# Patient Record
Sex: FEMALE | Race: WHITE | NOT HISPANIC OR LATINO | Employment: FULL TIME
[De-identification: names, ages, dates, MRNs, and addresses within clinical notes are randomized per-mention and may not be internally consistent; named-entity substitution may affect disease eponyms.]

---

## 2017-06-03 ENCOUNTER — HEALTH MAINTENANCE LETTER (OUTPATIENT)
Age: 32
End: 2017-06-03

## 2019-09-29 ENCOUNTER — HEALTH MAINTENANCE LETTER (OUTPATIENT)
Age: 34
End: 2019-09-29

## 2021-01-14 ENCOUNTER — HEALTH MAINTENANCE LETTER (OUTPATIENT)
Age: 36
End: 2021-01-14

## 2021-10-23 ENCOUNTER — HEALTH MAINTENANCE LETTER (OUTPATIENT)
Age: 36
End: 2021-10-23

## 2022-02-12 ENCOUNTER — HEALTH MAINTENANCE LETTER (OUTPATIENT)
Age: 37
End: 2022-02-12

## 2022-10-10 ENCOUNTER — HEALTH MAINTENANCE LETTER (OUTPATIENT)
Age: 37
End: 2022-10-10

## 2023-03-25 ENCOUNTER — HEALTH MAINTENANCE LETTER (OUTPATIENT)
Age: 38
End: 2023-03-25

## 2024-09-05 ENCOUNTER — VIRTUAL VISIT (OUTPATIENT)
Dept: CONSULT | Facility: CLINIC | Age: 39
End: 2024-09-05
Attending: GENETIC COUNSELOR, MS
Payer: COMMERCIAL

## 2024-09-05 DIAGNOSIS — Z84.89 FAMILY HISTORY OF GENETIC DISEASE: Primary | ICD-10-CM

## 2024-09-05 DIAGNOSIS — Z82.0 FAMILY HX OF ALS (AMYOTROPHIC LATERAL SCLEROSIS): ICD-10-CM

## 2024-09-05 PROCEDURE — 96040 HC GENETIC COUNSELING, EACH 30 MINUTES: CPT | Mod: GT,95 | Performed by: GENETIC COUNSELOR, MS

## 2024-09-05 NOTE — Clinical Note
2024      RE: Micaela Martinez  [address] [place] [id number]     Dear Colleague,    Thank you for the opportunity to participate in the care of your patient, Micaela Martinez, at the [facility] at [facility]. Please see a copy of my visit note below.    Micaela Martinez was seen for a genetic counseling appointment given her family history of TARDBP-related ALS.     Pertinent Medical History: Micaela is a 38 year old female with a family history of TARDBP-related ALS. Her mother was found to have a pathogenic TARDBP variant c. 1144G>A. Micaela denies symptoms of ALS or dementia.     Family History: A three generation pedigree was obtained at Micaela's mother's previous appointment and scanned into the EMR. This family history is by patient report only and has not been verified with medical records except where noted. The following information is significant:   Micaela's mother Iris has a pathogenic TARDBP variant c.1144G>A.   Micaela has five maternal aunts and three maternal uncles. Her uncle, Jovanny, (age 62) is alive and well and has one healthy daughter and one healthy son. Her aunt Marquise  at age 64 due to glioblastoma. She had three healthy sons and many heathy grandchildren. Her aunt Sophie  young due to an explosion that also killed her 18 month old daughter. Sophie has one other daughter (age 43) who is alive and well and has a son and daughter of her own who are alive and well. Micaela's aunt Danette  at age 75 due to Alzheimer's disease. She had on son and three daughters who are alive and well and one daughter who had breast cancer that was treated due to a double mastectomy. Micaela's aunt Татьяна (age 61) has a diagnosis of Schizophrenia. She has one daughter and two sons who are alive and well. Micaela's aunt Ingrid (age 74) is alive and well and tested negative for the familial TARDBP genetic variant. She has two  sons and one daughter who are alive and well and many grandchildren who are alive and well. Micaela's uncle Michael (age 77) has episodic fainting or blacking out due to unknown causes. He has one healthy son and one healthy daughter. Micaela's uncle Yoel (age 81) is in assisted living for age related reasons and spinal problems. He has one healthy son and one healthy daughter.  Micaela's maternal grandfather  at age 69 due to bulbar onset ALS that was diagnosed at age 67. He had bulbar symptoms and weak neck muscles at the time of his death. His sister  at age 92 due to limb onset ALS, his brother is  due to non ALS related causes, and his two other brothers  due to bulbar onset ALS. Micaela's maternal cousin once removed, Patt,  due to ALS and was found to have a TARDBP pathogenic variant. Paternal ancestry is [place].   Micaela's maternal grandmother  due to dementia. She had a history of non hodgkin's lymphoma. Micaela's maternal great grandmother is  and had cognitive concerns and brain trauma due to a motor vehicle accident during her lifetime. Micaela's  maternal great grandmother's sister is  and had Alzheimer's disease. Maternal ancestry is [place].   Family history is otherwise negative for ALS, dementia and genetic testing.     Discussion: Micaela's mother Denae Martinez was found to have a pathogenic variant in the TARDBP gene. This variant is technically called c. 1144G>A. Disease causing variants in the TARDBP gene are associated with an increased risk for ALS and frontotemporal dementia (FTD).      Clinical Presentation of ALS and FTD  Amyotrophic lateral sclerosis (ALS) is a condition that affects the motor neurons.  Motor neurons are responsible for sending the necessary signals to the muscles, to allow for movement and speech. In ALS, these motor neurons break down and eventually lead to loss of speech and muscle control. This is a progressive disorder,  meaning that the symptoms of this condition worsen over time. However, the rate of progression and muscles groups that are impacted the most is different for different individuals. For example, the first symptom that some people may experience is weakness in their throat muscles while others may experience weakness in an arm or a leg at the onset of symptoms.     Frontotemporal dementia (FTD) is a condition that affects the frontal and temporal lobes of the brain.These areas of the brain are located on the front and sides of your brain and are generally associated with personality, behavior and language. FTD causes these portions of the brain to shrink which can lead to a wide variety of symptoms including changes in personality, behavior, judgement, empathy, ability to understand the meaning of words, ability to give a name to people or objects and ability to pronounce certain words. People with FTD may also have slow or slurred speech and have difficulties putting sentences together.     A variant in the TARDBP gene presents differently in different people. Individuals with a variant in this gene may develop ALS, FTD, both of these conditions or none of these conditions. However, if an individual becomes affected, they most often develop ALS without signs or symptoms of FTD. Additionally, these conditions have a highly variable age of onset. For those that develop ALS, the age of symptom onset is most often between 40 and 65 but they can occur at any point in a person's life.  If Micaela is positive for this genetic change, we are unable to predict which features of this condition she would have in the future, the age of onset for these conditions or the speed of progression based on this genetic testing.     Inheritance of TARDBP  Disease causing changes in TARDBP are  typically inherited in an autosomal dominant pattern, meaning that a person only needs one nonworking gene copy to show signs or symptoms of a  condition and that both males and females can have the condition. If an individual has a disease causing change in their TARDBP gene, there is a 50% chance with each pregnancy that they will have a child who also has this genetic change and a 50% chance that they will have a child who does not have this genetic change.      Family Planning  Family planning options including PGT, prenatal testing, adoption and egg donation were briefly discussed. I explained that prenatal testing is only recommended if Micaela plans to terminate a pregnancy based on these results. I also explained that family planning options for the familial TARDBP gene variant are only available if Micaela tests positive for this variant.    Genetic Testing  We discussed the availability of genetic testing for ALS. There are several genes that have been found to be associated with ALS and/or FTD. This testing would only look for the specific change in the TARDBP gene that was identified in Micaela's family member to determine if she also has this change. In particular, we discussed that there are two possible results:   Negative: meaning normal or the familial mutation was not identified in the TARDBP gene  Positive: meaning a mutation that is disease causing was found in the TARDBP gene     Risks benefits and limitations for testing in asymptomatic individuals were reviewed. In particular, we discussed the importance of having a good support system in place during and after this testing process. Pre-symptomatic testing is an individual choice. Some people prefer to know so they can be aware of any symptoms and seek appropriate medical care and for family planning. Other people find it difficult to live in anticipation of the condition and prefer not to know. Future insurability is often a question for individuals undergoing pre-symptomatic testing. While there are federal and state laws that protect people from health insurance discrimination, there  is not as much regulation on life and disability insurance. There are many things to consider when contemplating pre-symptomatic testing genetic counseling and talking to a therapist or other support person can help in the decision making process. As treatments for ALS are developed pre-symptomatic testing may be an avenue to detect those individuals at risk to develop ALS and provide preventative treatment. However, at the time of this appointment, there is no cure for ALS or FTD.      Micaela expressed an excellent understanding of this information and stated that she would like to proceed with TARDBP genetic testing so that she can be well informed and better understand the risks to her future children. Micaela has a long standing history with a therapist that she trusts. She is not actively meeting with this therapist but can reestablish care should she feel it would be helpful. Micaela has told her parents, friends and fiance that she is undergoing this testing. She states that all of these people are an excellent support system. Micaela requested a neurology appointment for baseline evaluation if her genetic test results are positive. If her results are positive, this appointment will be scheduled        Plan:  1. TARDBP familial variant testing at the [facility] molecular diagnostics lab. Micaela will ask her mother Iris to submit a control sample for this testing.  2.Micaela requested an appointment with our neurologists specializing in ALS (Dr Gonsalves or Dr David) for a baseline neurologic evaluation if her results are positive. This appointment will be scheduled if her results are positive.  3. An in person appointment is scheduled in 6 weeks for in person results return. Micaela consented to waiting to receive her results until this appointment. Micaela will not be available to get these results between October 18th and 27 as she is getting  and going on her honeymoon.   4. Contact information was provided  should any questions arise in the future.       Shantel Lim [facility]  Genetic Counselor  Division of Genetics and Metabolism  (p) [phone]  (f) [phone]      Total time spent in consultation with the family was approximately 42 minutes     Cc: No Letter     Addendum: Micaela contacted me on 9/6/24 at 1pm and explained that, after thinking about it and discussing with her fiance, she does not want to proceed with familial TARDBP genetic testing. She stated that she was more anxious about the testing than she originally though she'd be and that the family planning options we discussed were not right for her. Micaela was encouraged to contact me should she have questions or would like to get tested in the future.      Please do not hesitate to contact me if you have any questions/concerns.     Sincerely,       Shantel Lim, GC

## 2024-09-05 NOTE — NURSING NOTE
How would you like to obtain your AVS? [employer]  If the video visit is dropped, the invitation should be resent by: Text to cell phone: [phone]  Will anyone else be joining your video visit? Floridalma Moya, EMT

## 2024-09-06 NOTE — PROGRESS NOTES
Micaela Martinez was seen for a genetic counseling appointment given her family history of TARDBP-related ALS.     Pertinent Medical History: Micaela is a 38 year old female with a family history of TARDBP-related ALS. Her mother was found to have a pathogenic TARDBP variant c. 1144G>A. Micaela denies symptoms of ALS or dementia.     Family History: A three generation pedigree was obtained at Micaela's mother's previous appointment and scanned into the EMR. This family history is by patient report only and has not been verified with medical records except where noted. The following information is significant:   Micaela's mother Iris has a pathogenic TARDBP variant c.1144G>A.   Micaela has five maternal aunts and three maternal uncles. Her uncle, Jovanny, (age 62) is alive and well and has one healthy daughter and one healthy son. Her aunt Marquise  at age 64 due to glioblastoma. She had three healthy sons and many heathy grandchildren. Her aunt Sophie  young due to an explosion that also killed her 18 month old daughter. Sophie has one other daughter (age 43) who is alive and well and has a son and daughter of her own who are alive and well. Micaela's aunt Danette  at age 75 due to Alzheimer's disease. She had on son and three daughters who are alive and well and one daughter who had breast cancer that was treated due to a double mastectomy. Micaela's aunt Татьяна (age 61) has a diagnosis of Schizophrenia. She has one daughter and two sons who are alive and well. Micaela's aunt Ingrid (age 74) is alive and well and tested negative for the familial TARDBP genetic variant. She has two sons and one daughter who are alive and well and many grandchildren who are alive and well. Micaela's uncle Michael (age 77) has episodic fainting or blacking out due to unknown causes. He has one healthy son and one healthy daughter. Micaela's uncle Yoel (age 81) is in assisted living for age related reasons and spinal problems. He has one healthy son  and one healthy daughter.  Micaela's maternal grandfather  at age 69 due to bulbar onset ALS that was diagnosed at age 67. He had bulbar symptoms and weak neck muscles at the time of his death. His sister  at age 92 due to limb onset ALS, his brother is  due to non ALS related causes, and his two other brothers  due to bulbar onset ALS. Micaela's maternal cousin once removed, Patt,  due to ALS and was found to have a TARDBP pathogenic variant. Paternal ancestry is [place].   Micaela's maternal grandmother  due to dementia. She had a history of non hodgkin's lymphoma. Micaela's maternal great grandmother is  and had cognitive concerns and brain trauma due to a motor vehicle accident during her lifetime. Micaela's  maternal great grandmother's sister is  and had Alzheimer's disease. Maternal ancestry is [place].   Family history is otherwise negative for ALS, dementia and genetic testing.     Discussion: Micaela's mother Deane Martinez was found to have a pathogenic variant in the TARDBP gene. This variant is technically called c. 1144G>A. Disease causing variants in the TARDBP gene are associated with an increased risk for ALS and frontotemporal dementia (FTD).      Clinical Presentation of ALS and FTD  Amyotrophic lateral sclerosis (ALS) is a condition that affects the motor neurons.  Motor neurons are responsible for sending the necessary signals to the muscles, to allow for movement and speech. In ALS, these motor neurons break down and eventually lead to loss of speech and muscle control. This is a progressive disorder, meaning that the symptoms of this condition worsen over time. However, the rate of progression and muscles groups that are impacted the most is different for different individuals. For example, the first symptom that some people may experience is weakness in their throat muscles while others may experience weakness in an arm or a leg at the onset of  symptoms.     Frontotemporal dementia (FTD) is a condition that affects the frontal and temporal lobes of the brain.These areas of the brain are located on the front and sides of your brain and are generally associated with personality, behavior and language. FTD causes these portions of the brain to shrink which can lead to a wide variety of symptoms including changes in personality, behavior, judgement, empathy, ability to understand the meaning of words, ability to give a name to people or objects and ability to pronounce certain words. People with FTD may also have slow or slurred speech and have difficulties putting sentences together.     A variant in the TARDBP gene presents differently in different people. Individuals with a variant in this gene may develop ALS, FTD, both of these conditions or none of these conditions. However, if an individual becomes affected, they most often develop ALS without signs or symptoms of FTD. Additionally, these conditions have a highly variable age of onset. For those that develop ALS, the age of symptom onset is most often between 40 and 65 but they can occur at any point in a person's life.  If Micaela is positive for this genetic change, we are unable to predict which features of this condition she would have in the future, the age of onset for these conditions or the speed of progression based on this genetic testing.     Inheritance of TARDBP  Disease causing changes in TARDBP are  typically inherited in an autosomal dominant pattern, meaning that a person only needs one nonworking gene copy to show signs or symptoms of a condition and that both males and females can have the condition. If an individual has a disease causing change in their TARDBP gene, there is a 50% chance with each pregnancy that they will have a child who also has this genetic change and a 50% chance that they will have a child who does not have this genetic change.      Family Planning  Family  planning options including PGT, prenatal testing, adoption and egg donation were briefly discussed. I explained that prenatal testing is only recommended if Micaela plans to terminate a pregnancy based on these results. I also explained that family planning options for the familial TARDBP gene variant are only available if Micaela tests positive for this variant.    Genetic Testing  We discussed the availability of genetic testing for ALS. There are several genes that have been found to be associated with ALS and/or FTD. This testing would only look for the specific change in the TARDBP gene that was identified in Micaela's family member to determine if she also has this change. In particular, we discussed that there are two possible results:   Negative: meaning normal or the familial mutation was not identified in the TARDBP gene  Positive: meaning a mutation that is disease causing was found in the TARDBP gene     Risks benefits and limitations for testing in asymptomatic individuals were reviewed. In particular, we discussed the importance of having a good support system in place during and after this testing process. Pre-symptomatic testing is an individual choice. Some people prefer to know so they can be aware of any symptoms and seek appropriate medical care and for family planning. Other people find it difficult to live in anticipation of the condition and prefer not to know. Future insurability is often a question for individuals undergoing pre-symptomatic testing. While there are federal and state laws that protect people from health insurance discrimination, there is not as much regulation on life and disability insurance. There are many things to consider when contemplating pre-symptomatic testing genetic counseling and talking to a therapist or other support person can help in the decision making process. As treatments for ALS are developed pre-symptomatic testing may be an avenue to detect those  individuals at risk to develop ALS and provide preventative treatment. However, at the time of this appointment, there is no cure for ALS or FTD.      Micaela expressed an excellent understanding of this information and stated that she would like to proceed with TARDBP genetic testing so that she can be well informed and better understand the risks to her future children. Micaela has a long standing history with a therapist that she trusts. She is not actively meeting with this therapist but can reestablish care should she feel it would be helpful. Micaela has told her parents, friends and fiance that she is undergoing this testing. She states that all of these people are an excellent support system. Micaela requested a neurology appointment for baseline evaluation if her genetic test results are positive. If her results are positive, this appointment will be scheduled        Plan:  1. TARDBP familial variant testing at the [facility] molecular diagnostics lab. Micaela will ask her mother Iris to submit a control sample for this testing.  2.Micaela requested an appointment with our neurologists specializing in ALS (Dr Gonsalves or Dr David) for a baseline neurologic evaluation if her results are positive. This appointment will be scheduled if her results are positive.  3. An in person appointment is scheduled in 6 weeks for in person results return. Micaela consented to waiting to receive her results until this appointment. Micaela will not be available to get these results between October 18th and 27 as she is getting  and going on her honeymoon.   4. Contact information was provided should any questions arise in the future.       Shantel Lim [facility]  Genetic Counselor  Division of Genetics and Metabolism  (p) [phone]  (f) [phone]      Total time spent in consultation with the family was approximately 42 minutes     Cc: No Letter     Addendum: Micaela contacted me on 9/6/24 at 1pm and explained that, after  thinking about it and discussing with her fiance, she does not want to proceed with familial TARDBP genetic testing. She stated that she was more anxious about the testing than she originally though she'd be and that the family planning options we discussed were not right for her. Micaela was encouraged to contact me should she have questions or would like to get tested in the future.

## 2024-10-13 ENCOUNTER — HEALTH MAINTENANCE LETTER (OUTPATIENT)
Age: 39
End: 2024-10-13

## 2024-11-26 ENCOUNTER — TELEPHONE (OUTPATIENT)
Dept: CONSULT | Facility: CLINIC | Age: 39
End: 2024-11-26
Payer: COMMERCIAL

## 2024-11-26 NOTE — TELEPHONE ENCOUNTER
Micaela contacted me and left a VM stating that she received a bill for her genetic counseling appointment and wanted to know if it is correct. I returned her call and left a VM asking her email me a copy of this bill so I can review it and provide more accurate information.    Shantel Lim MS [facility]  Genetic Counselor  Division of Genetics and Metabolism  (p) [phone]  (f) [phone]